# Patient Record
Sex: MALE | Race: WHITE | Employment: FULL TIME | URBAN - NONMETROPOLITAN AREA
[De-identification: names, ages, dates, MRNs, and addresses within clinical notes are randomized per-mention and may not be internally consistent; named-entity substitution may affect disease eponyms.]

---

## 2021-01-20 ENCOUNTER — TELEPHONE (OUTPATIENT)
Dept: PHYSICAL MEDICINE AND REHAB | Age: 41
End: 2021-01-20

## 2021-01-20 ENCOUNTER — OFFICE VISIT (OUTPATIENT)
Dept: PHYSICAL MEDICINE AND REHAB | Age: 41
End: 2021-01-20
Payer: COMMERCIAL

## 2021-01-20 VITALS
BODY MASS INDEX: 46.68 KG/M2 | SYSTOLIC BLOOD PRESSURE: 132 MMHG | DIASTOLIC BLOOD PRESSURE: 84 MMHG | HEIGHT: 68 IN | WEIGHT: 308 LBS

## 2021-01-20 DIAGNOSIS — G89.29 OTHER CHRONIC PAIN: ICD-10-CM

## 2021-01-20 DIAGNOSIS — M54.59 LUMBAR FACET JOINT PAIN: Primary | ICD-10-CM

## 2021-01-20 DIAGNOSIS — M79.18 MYOFASCIAL PAIN: ICD-10-CM

## 2021-01-20 DIAGNOSIS — L40.50 PSORIATIC ARTHRITIS (HCC): ICD-10-CM

## 2021-01-20 PROCEDURE — 99204 OFFICE O/P NEW MOD 45 MIN: CPT | Performed by: ANESTHESIOLOGY

## 2021-01-20 RX ORDER — BACLOFEN 10 MG/1
10 TABLET ORAL 3 TIMES DAILY PRN
Qty: 90 TABLET | Refills: 2 | Status: SHIPPED | OUTPATIENT
Start: 2021-01-20

## 2021-01-20 RX ORDER — TRAZODONE HYDROCHLORIDE 50 MG/1
TABLET ORAL
COMMUNITY
Start: 2020-12-24

## 2021-01-20 NOTE — PATIENT INSTRUCTIONS
The following treatment recommendations and plan were discussed in detail with Toshia Aguayo. Imaging:   I have reviewed patients imaging of MRI L-spine and results were discussed with patient today. Analgesics:   Patient is taking Acetaminophen. Patient informed that the maximum amount of acetaminophen taken on a regular basis should only be 4000 mg per day. Patient is taking Naproxen. Patient is advised to take as prescribed and not take on an empty stomach. Adjuvants: For continued chronic pain with associated musculoskeletal component, we will start Baclofen 10 mg at night and he can titrate up to three times a day. Interventions: In presence of lumbar axial back pain and with physical exam consistent for facetal pain, the option of medial branch blocks at bilateral L4/L5 and L5/S1 was chosen. The risks and benefits were discussed in detail with the patient. Patient wants to proceed with the injection. Anticoagulation/NPO Recommendations:   Patient does not need to hold any medications prior to the procedure. Patient does not need to be NPO prior to the procedure. We will start and IV prior to the procedure. Multidisciplinary Pain Management:   In the presence of complex, chronic, and multi-factorial pain, the importance of a multidisciplinary approach to pain management in the patients management regimen was emphasized and discussed in great detail. PHYSICAL THERAPY: Patient is advised to continue home stretching exercises in the morning and at night.     Referrals:  None    Prescriptions Written This Visit:   Baclofen    Follow-up: For lumbar MBBs

## 2021-01-20 NOTE — PROGRESS NOTES
reviewed and are negative    Objective:     Vitals:    01/20/21 1532   BP: 132/84       Constitutional: Pleasant, no acute distress   Head: Normocephalic, atraumatic   Eyes: Conjunctivae normal   Neck: Supple, symmetrical   Lungs: Normal respiratory effort, non-labored breathing   Cardiovascular: Limbs warm and well perfused   Abdomen: Non-protruded   Musculoskeletal: Muscle bulk symmetric, no atrophy, no gross deformities   · Lumbar Spine: Lumbar paraspinals non-tender bilaterally. SLR neg bilaterally. MERI neg bilaterally. Positive facet loading bilaterally. Bilateral SI joints non-tender to palpation. Bilateral greater trochanters non-tender to palpation. Neurological: Cranial nerves II-XII grossly intact. · Gait - Slightly antalgic gait. Ambulates without assist device. · Motor: 5/5 muscle strength in bilateral hip flexion, knee flexion, knee extension, dorsiflexion, and plantar flexion   · Sensory: LT sensation intact in lower limbs   · Reflexes: 2+ symmetrical in bilateral achilles, 2+ bilateral patellar, negative ankle clonus  Skin: No rashes or lesions visible in lower limbs   Psychological: Cooperative, no exaggerated pain behaviors       Assessment:    Diagnosis Orders   1. Lumbar facet joint pain  CHG FLUOR NEEDLE/CATH SPINE/PARASPINAL DX/THER ADDON    MA INJ DX/THER AGNT PARAVERT FACET JOINT, LUMBAR/SAC, 1ST LEVEL    MA INJ DX/THER AGNT PARAVERT FACET JOINT, LUMBAR/SAC, 2ND LEVEL   2. Myofascial pain     3. Other chronic pain     4. Psoriatic arthritis ([facility] [address]         Phyllis Damon is a 38 y.o.male presenting to the pain clinic for evaluation of chronic low back pain. His clinical history and physical examination are consistent with lumbar facet mediated pain with associated myofascial pain.   I have set him up for diagnostic lumbar medial branch blocks targeting the bilateral facet joints of L4/L5 and L5/S1 with the anticipation of performing a thermal radiofrequency ablation at these levels. In addition, I have encouraged him to continue stretching his lumbar spine in the morning and at night. I have started him on baclofen 10 mg they can take up to 3 times a day to work on his myofascial pain. Plan: The following treatment recommendations and plan were discussed in detail with Yves Finley. Imaging:   I have reviewed patients imaging of MRI L-spine and results were discussed with patient today. Analgesics:   Patient is taking Acetaminophen. Patient informed that the maximum amount of acetaminophen taken on a regular basis should only be 4000 mg per day. Patient is taking Naproxen. Patient is advised to take as prescribed and not take on an empty stomach. Adjuvants: For continued chronic pain with associated musculoskeletal component, we will start Baclofen 10 mg at night and he can titrate up to three times a day. Interventions: In presence of lumbar axial back pain and with physical exam consistent for facetal pain, the option of medial branch blocks at bilateral L4/L5 and L5/S1 was chosen with the anticipation of performing a thermal radiofrequency ablation at these levels. The risks and benefits were discussed in detail with the patient. Patient wants to proceed with the injection. Anticoagulation/NPO Recommendations:   Patient will need to hold Naproxen x 7 days prior to the procedure. Patient will need to be NPO x 8 hours prior to the procedure. We will start and IV prior to the procedure. Multidisciplinary Pain Management:   In the presence of complex, chronic, and multi-factorial pain, the importance of a multidisciplinary approach to pain management in the patients management regimen was emphasized and discussed in great detail. PHYSICAL THERAPY: Patient is advised to continue home stretching exercises in the morning and at night.     Referrals:  None    Prescriptions Written This Visit:   Baclofen    Follow-up: For lumbar Josef Hook, DO  Interventional Pain Management/PM&R   [place]

## 2021-01-20 NOTE — TELEPHONE ENCOUNTER
Spoke with Dr. Santhosh Matta. He stated pt must be NPO 8 hours before procedure and hold Naprosyn 7 days before procedure.

## 2021-01-27 ENCOUNTER — TELEPHONE (OUTPATIENT)
Dept: PHYSICAL MEDICINE AND REHAB | Age: 41
End: 2021-01-27

## 2021-01-27 NOTE — TELEPHONE ENCOUNTER
Well we will leave it as PRN follow-up      Dudley Butt, DO  Interventional Pain Management/PM&R   [place]

## 2021-01-27 NOTE — TELEPHONE ENCOUNTER
Pt. Contacted. Wants to cancel his procedure and follow up at this time. States that Physical Therapy is helping. Will call back as needed.

## 2021-01-27 NOTE — TELEPHONE ENCOUNTER
[facility] procedure denial received. Scanned into media. Denied due to records do not show that your physician is planning to perform an  RFA if the requested service is performed and it helps the pt's symptoms, The use of the requested service without a plan to perform the RFA is not supported. LVM for pt. to call office regarding this. Please advise how to proceed.  Thanks